# Patient Record
Sex: FEMALE | Race: BLACK OR AFRICAN AMERICAN | NOT HISPANIC OR LATINO | Employment: OTHER | URBAN - METROPOLITAN AREA
[De-identification: names, ages, dates, MRNs, and addresses within clinical notes are randomized per-mention and may not be internally consistent; named-entity substitution may affect disease eponyms.]

---

## 2017-04-17 ENCOUNTER — HOSPITAL ENCOUNTER (EMERGENCY)
Facility: HOSPITAL | Age: 27
Discharge: LEFT BEFORE DISCHARGE | End: 2017-04-17

## 2017-04-17 VITALS
HEIGHT: 63 IN | DIASTOLIC BLOOD PRESSURE: 60 MMHG | HEART RATE: 66 BPM | OXYGEN SATURATION: 99 % | WEIGHT: 160 LBS | TEMPERATURE: 97.3 F | RESPIRATION RATE: 18 BRPM | SYSTOLIC BLOOD PRESSURE: 105 MMHG | BODY MASS INDEX: 28.35 KG/M2

## 2017-04-17 PROCEDURE — 99211 OFF/OP EST MAY X REQ PHY/QHP: CPT

## 2017-04-17 RX ORDER — QUETIAPINE FUMARATE 100 MG/1
100 TABLET, FILM COATED ORAL NIGHTLY
COMMUNITY

## 2017-04-17 RX ORDER — GABAPENTIN 600 MG/1
600 TABLET ORAL 3 TIMES DAILY
COMMUNITY

## 2017-04-17 RX ORDER — CYCLOBENZAPRINE HCL 10 MG
10 TABLET ORAL 3 TIMES DAILY PRN
COMMUNITY

## 2017-04-17 NOTE — ED NOTES
Pt approached the desk stating that she was hurting too much and that we needed to get her back immediately. I stated that we needed to do an EKG on her because  Of her chest pain. She stated that that wouldn't help her pain at all and that she didn't want to do that. She then walked away from the desk.     Gabby Mendez  04/17/17 7222

## 2017-04-18 ENCOUNTER — APPOINTMENT (OUTPATIENT)
Dept: GENERAL RADIOLOGY | Facility: HOSPITAL | Age: 27
End: 2017-04-18

## 2017-04-18 ENCOUNTER — HOSPITAL ENCOUNTER (EMERGENCY)
Facility: HOSPITAL | Age: 27
Discharge: HOME | End: 2017-04-18
Attending: EMERGENCY MEDICINE | Admitting: EMERGENCY MEDICINE

## 2017-04-18 VITALS
HEIGHT: 63 IN | RESPIRATION RATE: 16 BRPM | BODY MASS INDEX: 28.35 KG/M2 | DIASTOLIC BLOOD PRESSURE: 76 MMHG | OXYGEN SATURATION: 99 % | HEART RATE: 70 BPM | SYSTOLIC BLOOD PRESSURE: 117 MMHG | WEIGHT: 160 LBS | TEMPERATURE: 97.8 F

## 2017-04-18 DIAGNOSIS — G89.29 OTHER CHRONIC PAIN: Primary | ICD-10-CM

## 2017-04-18 DIAGNOSIS — D57.00 SICKLE-CELL DISEASE WITH PAIN (HCC): ICD-10-CM

## 2017-04-18 LAB
ALBUMIN SERPL-MCNC: 4.6 G/DL (ref 3.5–5.2)
ALBUMIN/GLOB SERPL: 1.6 G/DL
ALP SERPL-CCNC: 76 U/L (ref 39–117)
ALT SERPL W P-5'-P-CCNC: 10 U/L (ref 1–33)
ANION GAP SERPL CALCULATED.3IONS-SCNC: 13.8 MMOL/L
ANISOCYTOSIS BLD QL: NORMAL
AST SERPL-CCNC: 12 U/L (ref 1–32)
BASOPHILS # BLD AUTO: 0.04 10*3/MM3 (ref 0–0.2)
BASOPHILS NFR BLD AUTO: 0.7 % (ref 0–1.5)
BILIRUB SERPL-MCNC: 3 MG/DL (ref 0.1–1.2)
BUN BLD-MCNC: 5 MG/DL (ref 6–20)
BUN/CREAT SERPL: 7.4 (ref 7–25)
C3 FRG RBC-MCNC: NORMAL
CALCIUM SPEC-SCNC: 9.4 MG/DL (ref 8.6–10.5)
CHLORIDE SERPL-SCNC: 108 MMOL/L (ref 98–107)
CO2 SERPL-SCNC: 22.2 MMOL/L (ref 22–29)
CREAT BLD-MCNC: 0.68 MG/DL (ref 0.57–1)
DEPRECATED RDW RBC AUTO: 82.2 FL (ref 37–54)
EOSINOPHIL # BLD AUTO: 0.08 10*3/MM3 (ref 0–0.7)
EOSINOPHIL NFR BLD AUTO: 1.4 % (ref 0.3–6.2)
ERYTHROCYTE [DISTWIDTH] IN BLOOD BY AUTOMATED COUNT: 21.2 % (ref 11.7–13)
GFR SERPL CREATININE-BSD FRML MDRD: 126 ML/MIN/1.73
GLOBULIN UR ELPH-MCNC: 2.8 GM/DL
GLUCOSE BLD-MCNC: 89 MG/DL (ref 65–99)
HCT VFR BLD AUTO: 27.2 % (ref 35.6–45.5)
HGB BLD-MCNC: 9.4 G/DL (ref 11.9–15.5)
HOWELL-JOLLY BOD BLD QL SMEAR: PRESENT
HYPOCHROMIA BLD QL: NORMAL
IMM GRANULOCYTES # BLD: 0 10*3/MM3 (ref 0–0.03)
IMM GRANULOCYTES NFR BLD: 0 % (ref 0–0.5)
LYMPHOCYTES # BLD AUTO: 2.25 10*3/MM3 (ref 0.9–4.8)
LYMPHOCYTES NFR BLD AUTO: 38 % (ref 19.6–45.3)
MACROCYTES BLD QL SMEAR: NORMAL
MCH RBC QN AUTO: 36.9 PG (ref 26.9–32)
MCHC RBC AUTO-ENTMCNC: 34.6 G/DL (ref 32.4–36.3)
MCV RBC AUTO: 106.7 FL (ref 80.5–98.2)
MONOCYTES # BLD AUTO: 0.64 10*3/MM3 (ref 0.2–1.2)
MONOCYTES NFR BLD AUTO: 10.8 % (ref 5–12)
NEUTROPHILS # BLD AUTO: 2.91 10*3/MM3 (ref 1.9–8.1)
NEUTROPHILS NFR BLD AUTO: 49.1 % (ref 42.7–76)
NRBC BLD MANUAL-RTO: 2.3 /100 WBC (ref 0–0)
PLAT MORPH BLD: NORMAL
PLATELET # BLD AUTO: 411 10*3/MM3 (ref 140–500)
PMV BLD AUTO: 9.8 FL (ref 6–12)
POTASSIUM BLD-SCNC: 3.1 MMOL/L (ref 3.5–5.2)
PROT SERPL-MCNC: 7.4 G/DL (ref 6–8.5)
RBC # BLD AUTO: 2.55 10*6/MM3 (ref 3.9–5.2)
RETICS/RBC NFR AUTO: 6.47 % (ref 0.5–1.5)
SICKLE CELLS: NORMAL
SODIUM BLD-SCNC: 144 MMOL/L (ref 136–145)
TARGETS BLD QL SMEAR: NORMAL
WBC MORPH BLD: NORMAL
WBC NRBC COR # BLD: 5.92 10*3/MM3 (ref 4.5–10.7)

## 2017-04-18 PROCEDURE — 71020 HC CHEST PA AND LATERAL: CPT

## 2017-04-18 PROCEDURE — 96374 THER/PROPH/DIAG INJ IV PUSH: CPT

## 2017-04-18 PROCEDURE — 99283 EMERGENCY DEPT VISIT LOW MDM: CPT

## 2017-04-18 PROCEDURE — 85007 BL SMEAR W/DIFF WBC COUNT: CPT | Performed by: EMERGENCY MEDICINE

## 2017-04-18 PROCEDURE — 25010000002 HYDROMORPHONE PER 4 MG: Performed by: EMERGENCY MEDICINE

## 2017-04-18 PROCEDURE — 80053 COMPREHEN METABOLIC PANEL: CPT | Performed by: EMERGENCY MEDICINE

## 2017-04-18 PROCEDURE — 85025 COMPLETE CBC W/AUTO DIFF WBC: CPT | Performed by: EMERGENCY MEDICINE

## 2017-04-18 PROCEDURE — 25010000002 HEPARIN FLUSH (PORCINE) 100 UNIT/ML SOLUTION

## 2017-04-18 PROCEDURE — 96361 HYDRATE IV INFUSION ADD-ON: CPT

## 2017-04-18 PROCEDURE — 85045 AUTOMATED RETICULOCYTE COUNT: CPT | Performed by: EMERGENCY MEDICINE

## 2017-04-18 RX ORDER — HYDROMORPHONE HYDROCHLORIDE 1 MG/ML
0.5 INJECTION, SOLUTION INTRAMUSCULAR; INTRAVENOUS; SUBCUTANEOUS ONCE
Status: COMPLETED | OUTPATIENT
Start: 2017-04-18 | End: 2017-04-18

## 2017-04-18 RX ADMIN — SODIUM CHLORIDE 1000 ML: 9 INJECTION, SOLUTION INTRAVENOUS at 15:14

## 2017-04-18 RX ADMIN — Medication 500 UNITS: at 17:24

## 2017-04-18 RX ADMIN — HYDROMORPHONE HYDROCHLORIDE 0.5 MG: 1 INJECTION, SOLUTION INTRAMUSCULAR; INTRAVENOUS; SUBCUTANEOUS at 15:19

## 2017-04-18 NOTE — DISCHARGE INSTRUCTIONS
Follow up with your nurse practitioner at the [facility] as scheduled.  You also need to follow up with your pain management physician as soon as possible.  Continue your current medications.

## 2017-04-18 NOTE — ED TRIAGE NOTES
"Pt states she was here yesterday but the wait was long. States her pain is 10 times worse today. States she has \"sickle cell crisis, chest pain and leg pain.\"  Patient on phone carrying on a conversation.   "

## 2017-04-18 NOTE — ED PROVIDER NOTES
EMERGENCY DEPARTMENT ENCOUNTER       CHIEF COMPLAINT  Chief Complaint: sickle cell pain crisis  History given by: patient  History limited by: N/A  Room Number: 12/12  PMD: No Known Provider  Hematologist- [facility] at     HPI:  HPI Comments: Pt has hx of sickle cell anemia for which she is on hydroxyurea. She c/o general body aches and diffuse chest pain which started a few days ago and are characteristic of previous episodes of sickle cell pain crisis. She has also had nausea and states that she vomited about 5 days ago (no vomiting since then). She denies trouble breathing, sweating, diarrhea, fevers, chills, abd pain, pain and difficulty with urination, weakness, dizziness, headache, and medical noncompliance (states that she has been taking her hydroxyurea and eliquis as directed). Pt also states that she was last seen at a healthcare facility for sickle cell pain crisis about 2 to 3 months ago. Pt has no other complaints at this time.     Patient is a 27 y.o. female presenting with sickle cell pain.   History provided by:  Patient  Sickle Cell Pain Crisis   Location:  Chest (general body)  Severity:  Moderate  Onset quality:  Gradual  Duration: started a few days ago.  Similar to previous crisis episodes: yes    Timing:  Constant  Progression:  Worsening  Chronicity:  Recurrent  Relieved by: taking hydrocodone.  Worsened by:  Nothing  Associated symptoms: chest pain and nausea    Associated symptoms: no congestion, no cough, no fever, no headaches, no shortness of breath and no sore throat  Vomiting: occurred 5 days ago, resolved.          PAST MEDICAL HISTORY  Active Ambulatory Problems     Diagnosis Date Noted   • Sickle cell crisis 05/21/2016   • Anemia 05/21/2016   • Atelectasis of right lower lobe 05/21/2016   • SLE (systemic lupus erythematosus) 05/21/2016   • Blood clotting disorder 05/21/2016   • Dehydration 05/21/2016     Resolved Ambulatory Problems     Diagnosis Date Noted   • No  Resolved Ambulatory Problems     Past Medical History:   Diagnosis Date   • Blood clotting disorder    • Lupus    • PE (pulmonary thromboembolism)    • Sickle cell anemia          PAST SURGICAL HISTORY  Past Surgical History:   Procedure Laterality Date   • BACK SURGERY     • CHOLECYSTECTOMY     • SPLENECTOMY     • SPLENECTOMY           FAMILY HISTORY  History reviewed. No pertinent family history.      SOCIAL HISTORY  Social History     Social History   • Marital status:      Spouse name: N/A   • Number of children: N/A   • Years of education: N/A     Occupational History   • Not on file.     Social History Main Topics   • Smoking status: Never Smoker   • Smokeless tobacco: Not on file   • Alcohol use No   • Drug use: No   • Sexual activity: Defer     Other Topics Concern   • Not on file     Social History Narrative         ALLERGIES  Amoxicillin; Aspirin; Augmentin [amoxicillin-pot clavulanate]; Ceclor [cefaclor]; Morphine and related; Rocephin [ceftriaxone]; Toradol [ketorolac tromethamine]; Tramadol; and Zofran [ondansetron hcl]        REVIEW OF SYSTEMS  Review of Systems   Constitutional: Negative for chills and fever.   HENT: Negative for congestion, rhinorrhea and sore throat.    Eyes: Negative for pain.   Respiratory: Negative for cough and shortness of breath.    Cardiovascular: Positive for chest pain. Negative for palpitations.   Gastrointestinal: Positive for nausea. Negative for abdominal pain and diarrhea. Vomiting: occurred 5 days ago, resolved.   Endocrine: Negative.    Genitourinary: Negative for difficulty urinating.   Musculoskeletal: Positive for arthralgias (generalized) and myalgias (generalized).   Skin: Negative.    Neurological: Negative for speech difficulty, weakness, numbness and headaches.   Psychiatric/Behavioral: Negative.    All other systems reviewed and are negative.           PHYSICAL EXAM  ED Triage Vitals   Temp Heart Rate Resp BP SpO2   04/18/17 1213 04/18/17 1213  04/18/17 1213 04/18/17 1222 04/18/17 1213   96.3 °F (35.7 °C) 98 15 105/63 96 % WNL      Temp src Heart Rate Source Patient Position BP Location FiO2 (%)   04/18/17 1213 04/18/17 1213 -- -- --   Tympanic Monitor          Physical Exam   Constitutional: She is oriented to person, place, and time. No distress.   HENT:   Head: Normocephalic.   Mouth/Throat: Mucous membranes are normal.   Eyes: EOM are normal. Pupils are equal, round, and reactive to light.   Neck: Normal range of motion. Neck supple.   Cardiovascular: Normal rate, regular rhythm and normal heart sounds.    Pulmonary/Chest: Effort normal and breath sounds normal. No respiratory distress. She has no decreased breath sounds. She has no wheezes. She has no rhonchi. She has no rales.   Abdominal: Soft. There is no tenderness. There is no rebound and no guarding.   Musculoskeletal: Normal range of motion.   Neurological: She is alert and oriented to person, place, and time. She has normal sensation and normal strength.   Skin: Skin is warm and dry.   Psychiatric: Mood and affect normal.   Nursing note and vitals reviewed.          LAB RESULTS  Recent Results (from the past 24 hour(s))   Comprehensive Metabolic Panel    Collection Time: 04/18/17  3:13 PM   Result Value Ref Range    Glucose 89 65 - 99 mg/dL    BUN 5 (L) 6 - 20 mg/dL    Creatinine 0.68 0.57 - 1.00 mg/dL    Sodium 144 136 - 145 mmol/L    Potassium 3.1 (L) 3.5 - 5.2 mmol/L    Chloride 108 (H) 98 - 107 mmol/L    CO2 22.2 22.0 - 29.0 mmol/L    Calcium 9.4 8.6 - 10.5 mg/dL    Total Protein 7.4 6.0 - 8.5 g/dL    Albumin 4.60 3.50 - 5.20 g/dL    ALT (SGPT) 10 1 - 33 U/L    AST (SGOT) 12 1 - 32 U/L    Alkaline Phosphatase 76 39 - 117 U/L    Total Bilirubin 3.0 (H) 0.1 - 1.2 mg/dL    eGFR  African Amer 126 >60 mL/min/1.73    Globulin 2.8 gm/dL    A/G Ratio 1.6 g/dL    BUN/Creatinine Ratio 7.4 7.0 - 25.0    Anion Gap 13.8 mmol/L   Reticulocytes    Collection Time: 04/18/17  3:13 PM   Result Value Ref  Range    Reticulocyte % 6.47 (H) 0.50 - 1.50 %   CBC Auto Differential    Collection Time: 04/18/17  3:13 PM   Result Value Ref Range    WBC 5.92 4.50 - 10.70 10*3/mm3    RBC 2.55 (L) 3.90 - 5.20 10*6/mm3    Hemoglobin 9.4 (L) 11.9 - 15.5 g/dL    Hematocrit 27.2 (L) 35.6 - 45.5 %    .7 (H) 80.5 - 98.2 fL    MCH 36.9 (H) 26.9 - 32.0 pg    MCHC 34.6 32.4 - 36.3 g/dL    RDW 21.2 (H) 11.7 - 13.0 %    RDW-SD 82.2 (H) 37.0 - 54.0 fl    MPV 9.8 6.0 - 12.0 fL    Platelets 411 140 - 500 10*3/mm3    Neutrophil % 49.1 42.7 - 76.0 %    Lymphocyte % 38.0 19.6 - 45.3 %    Monocyte % 10.8 5.0 - 12.0 %    Eosinophil % 1.4 0.3 - 6.2 %    Basophil % 0.7 0.0 - 1.5 %    Immature Grans % 0.0 0.0 - 0.5 %    Neutrophils, Absolute 2.91 1.90 - 8.10 10*3/mm3    Lymphocytes, Absolute 2.25 0.90 - 4.80 10*3/mm3    Monocytes, Absolute 0.64 0.20 - 1.20 10*3/mm3    Eosinophils, Absolute 0.08 0.00 - 0.70 10*3/mm3    Basophils, Absolute 0.04 0.00 - 0.20 10*3/mm3    Immature Grans, Absolute 0.00 0.00 - 0.03 10*3/mm3    nRBC 2.3 (H) 0.0 - 0.0 /100 WBC   Scan Slide    Collection Time: 04/18/17  3:13 PM   Result Value Ref Range    Anisocytosis Large/3+ None Seen    Waldrop-Esmont Bodies Present None Seen    Hypochromia Mod/2+ None Seen    Macrocytes Mod/2+ None Seen    RBC Fragments Slight/1+ None Seen    Sickle Cells Slight/1+ None Seen    Target Cells Slight/1+ None Seen    WBC Morphology Normal Normal    Platelet Morphology Normal Normal       Ordered the above labs and reviewed the results.        RADIOLOGY  XR Chest 2 View   Final Result         CXR (independently viewed by me, interpreted by radiologist)- probable tiny right pleural effusion, otherwise unremarkable     Ordered the above noted radiological studies. Reviewed by me in PACS.       PROCEDURES  Procedures      PROGRESS AND CONSULTS  ED Course     2:29 PM- Ordered IV fluids for hydration. Ordered blood work, reticulocyte count, and CXR for further evaluation.    3:09 PM- Ordered  dilaudid for pain.     3:45 PM- Message left with pt's hematologist (NEEL Brady) at [facility].     4:58 PM- Discussed case with Ms Tong (NEEL at [facility])  Reviewed history, exam, results and treatments.  Discussed concerns and plan of care. Ms Tong states that pt has frequent issues with pain control and is scheduled to follow up on 4/20/17 at their office.     5:00 PM- EKASPER queried and reviewed. Pt had #90 10mg hydrocodone filled on 3/28/17.     5:02 PM- Rechecked pt. She is resting comfortably and is in no acute distress. D/w pt about all pertinent results including hemoglobin of 9.4 and reticulocyte count of 6.47. Informed pt that she had hydrocodone filled on 3/28/17 per EKASPER report. Pt states that she is in pain management. She reports that she has taken extra doses of her hydrocodone for her pain and has consequently finished taking it early. Notified pt that I cannot prescribe any pain medication as she is in pain management. Instructed to f/u with hematologist for recheck as scheduled on 4/20/17. RTER if sx worsen. Pt understands and agrees with plan. All questions addressed.        MEDICAL DECISION MAKING  Results were reviewed/discussed with the patient.     MDM  Number of Diagnoses or Management Options  Other chronic pain:   Sickle-cell disease with pain:      Amount and/or Complexity of Data Reviewed  Clinical lab tests: reviewed and ordered (Reticulocytes= 6.47, hgb= 9.4)  Tests in the radiology section of CPT®: reviewed and ordered (CXR- probable tiny right pleural effusion, otherwise unremarkable )  Decide to obtain previous medical records or to obtain history from someone other than the patient: yes  Review and summarize past medical records: yes (5 months ago, hgb was 9.2 and reticulocyte count was 8.86. )  Discuss the patient with other providers: yes (Ms Tong (NEEL at [facility]))  Independent visualization of images, tracings, or specimens:  yes    Patient Progress  Patient progress: stable             DIAGNOSIS  Final diagnoses:   Other chronic pain   Sickle-cell disease with pain         DISPOSITION  Discharged    DISCHARGE    Patient discharged in stable condition.    Reviewed implications of results, diagnosis, meds, responsibility to follow up, warning signs and symptoms of possible worsening, potential complications and reasons to return to ER.    Patient voiced understanding of above instructions.    Discussed plan for discharge, as there is no emergent indication for admission.  Pt is agreeable and understands need for follow up and repeat testing.  Pt is aware that discharge does not mean that nothing is wrong but it indicates no emergency is present and they must continue care with follow-up as given below or physician of their choice.     FOLLOW-UP  [facility] as scheduled       Latest Documented Vital Signs:  As of 5:15 PM  BP- 114/72 HR- 64 Temp- 96.3 °F (35.7 °C) (Tympanic) O2 sat- 99%      --  Documentation assistance provided by fabrizio Bartholomew for Dr Cotton.  Information recorded by the scribe was done at my direction and has been verified and validated by me.         Shayne Bartholomew  04/18/17 1910       Ambrocio Cotton MD  04/18/17 1923

## 2017-05-24 ENCOUNTER — HOSPITAL ENCOUNTER (EMERGENCY)
Facility: HOSPITAL | Age: 27
Discharge: LEFT BEFORE DISCHARGE | End: 2017-05-24
Attending: EMERGENCY MEDICINE | Admitting: EMERGENCY MEDICINE

## 2017-05-24 ENCOUNTER — APPOINTMENT (OUTPATIENT)
Dept: GENERAL RADIOLOGY | Facility: HOSPITAL | Age: 27
End: 2017-05-24

## 2017-05-24 VITALS
BODY MASS INDEX: 28.35 KG/M2 | RESPIRATION RATE: 18 BRPM | OXYGEN SATURATION: 96 % | WEIGHT: 160 LBS | DIASTOLIC BLOOD PRESSURE: 71 MMHG | SYSTOLIC BLOOD PRESSURE: 112 MMHG | TEMPERATURE: 97.3 F | HEIGHT: 63 IN | HEART RATE: 67 BPM

## 2017-05-24 DIAGNOSIS — D57.1 SICKLE CELL DISEASE WITHOUT CRISIS (HCC): ICD-10-CM

## 2017-05-24 DIAGNOSIS — G89.4 CHRONIC PAIN SYNDROME: Primary | ICD-10-CM

## 2017-05-24 PROCEDURE — 99284 EMERGENCY DEPT VISIT MOD MDM: CPT

## 2017-05-24 PROCEDURE — 71020 HC CHEST PA AND LATERAL: CPT

## 2017-05-24 RX ORDER — HYDROCODONE BITARTRATE AND ACETAMINOPHEN 7.5; 325 MG/1; MG/1
1 TABLET ORAL EVERY 6 HOURS PRN
Status: DISCONTINUED | OUTPATIENT
Start: 2017-05-24 | End: 2017-05-24 | Stop reason: HOSPADM

## 2017-05-24 RX ORDER — ONDANSETRON 4 MG/1
4 TABLET, ORALLY DISINTEGRATING ORAL ONCE
Status: DISCONTINUED | OUTPATIENT
Start: 2017-05-24 | End: 2017-05-24 | Stop reason: HOSPADM

## 2017-07-02 ENCOUNTER — INPATIENT HOSPITAL (OUTPATIENT)
Dept: URBAN - METROPOLITAN AREA HOSPITAL 84 | Facility: HOSPITAL | Age: 27
End: 2017-07-02

## 2017-07-02 DIAGNOSIS — D57.819 OTHER SICKLE-CELL DISORDERS WITH CRISIS, UNSPECIFIED: ICD-10-CM

## 2017-07-02 DIAGNOSIS — R74.8 ABNORMAL LEVELS OF OTHER SERUM ENZYMES: ICD-10-CM

## 2017-07-02 DIAGNOSIS — R19.5 OTHER FECAL ABNORMALITIES: ICD-10-CM

## 2017-07-02 PROCEDURE — 99221 1ST HOSP IP/OBS SF/LOW 40: CPT | Performed by: INTERNAL MEDICINE

## 2018-03-23 ENCOUNTER — APPOINTMENT (OUTPATIENT)
Dept: LAB | Facility: HOSPITAL | Age: 28
End: 2018-03-23

## 2018-03-23 ENCOUNTER — APPOINTMENT (OUTPATIENT)
Dept: ONCOLOGY | Facility: CLINIC | Age: 28
End: 2018-03-23

## 2020-10-02 ENCOUNTER — TELEPHONE (OUTPATIENT)
Dept: ORTHOPEDICS | Facility: OTHER | Age: 30
End: 2020-10-02

## 2020-10-02 NOTE — TELEPHONE ENCOUNTER
PT.CALLED TO CXL HER APPT. TODAY AT 2:50P.M. WITH .  SHE APOLOGIZED- HER RIDE TARC 3 CANCELLED ON HER TODAY AND SHE DOES NOT HAVE A RIDE.   SHE IS RESCHEDULED FOR 10/30/2020.  PT. STATES TO LET OFFICE KNOW THAT SHE IS HAVING XRAYS DONE AT HER HEMATOLOGIST ON Monday AND WILL HAVE THEM SEND RESULTS TO DR. REY.

## 2020-10-30 ENCOUNTER — OFFICE VISIT (OUTPATIENT)
Dept: ORTHOPEDIC SURGERY | Facility: CLINIC | Age: 30
End: 2020-10-30

## 2020-10-30 VITALS — TEMPERATURE: 98 F | BODY MASS INDEX: 25.87 KG/M2 | WEIGHT: 146 LBS | HEIGHT: 63 IN

## 2020-10-30 DIAGNOSIS — M25.511 RIGHT SHOULDER PAIN, UNSPECIFIED CHRONICITY: Primary | ICD-10-CM

## 2020-10-30 PROCEDURE — 73030 X-RAY EXAM OF SHOULDER: CPT | Performed by: ORTHOPAEDIC SURGERY

## 2020-10-30 PROCEDURE — 99203 OFFICE O/P NEW LOW 30 MIN: CPT | Performed by: ORTHOPAEDIC SURGERY

## 2020-10-30 RX ORDER — HYDROCODONE BITARTRATE AND ACETAMINOPHEN 10; 325 MG/1; MG/1
1 TABLET ORAL
COMMUNITY
Start: 2016-10-20

## 2020-10-30 RX ORDER — ALBUTEROL SULFATE 90 UG/1
2 AEROSOL, METERED RESPIRATORY (INHALATION) EVERY 4 HOURS PRN
COMMUNITY

## 2020-10-30 RX ORDER — DEFERASIROX 500 MG/1
TABLET, FOR SUSPENSION ORAL
COMMUNITY

## 2020-10-30 RX ORDER — FOLIC ACID 1 MG/1
1 TABLET ORAL DAILY
COMMUNITY

## 2020-10-30 RX ORDER — POTASSIUM CITRATE 5 MEQ/1
TABLET, EXTENDED RELEASE ORAL
COMMUNITY

## 2020-10-30 RX ORDER — ALBUTEROL SULFATE 2.5 MG/3ML
2.5 SOLUTION RESPIRATORY (INHALATION) EVERY 4 HOURS PRN
COMMUNITY

## 2020-10-30 RX ORDER — LANOLIN ALCOHOL/MO/W.PET/CERES
1000 CREAM (GRAM) TOPICAL DAILY
COMMUNITY

## 2020-10-30 RX ORDER — GABAPENTIN 600 MG/1
600 TABLET ORAL
COMMUNITY
End: 2020-10-30 | Stop reason: SDUPTHER

## 2020-10-30 RX ORDER — PROMETHAZINE HYDROCHLORIDE 6.25 MG/5ML
SYRUP ORAL 4 TIMES DAILY PRN
COMMUNITY

## 2020-10-30 RX ORDER — LEVOFLOXACIN 500 MG/1
500 TABLET, FILM COATED ORAL DAILY
COMMUNITY

## 2020-10-30 NOTE — PROGRESS NOTES
Patient: Orville Flores    YOB: 1990    Medical Record Number: [id number]    Chief Complaints:  Right shoulder pain    History of Present Illness:     30 y.o. female patient who comes in for evaluation of her right shoulder.  She has a history of sickle cell disease.  She has avascular necrosis in her left hip.  She has been told that she has avascular necrosis in her right shoulder.  The shoulder has been bothering her for years, getting progressively worse.  She is right-hand dominant and this has been very debilitating for her.  Her pain is typically severe, constant, and both aching and grinding.  Her range of motion is very limited.  She reports that she cannot reach or lift overhead.    Allergies:   Allergies   Allergen Reactions   • Amoxicillin    • Aspirin    • Augmentin [Amoxicillin-Pot Clavulanate]    • Ceclor [Cefaclor]    • Morphine And Related    • Rocephin [Ceftriaxone]    • Toradol [Ketorolac Tromethamine]    • Tramadol    • Zofran [Ondansetron Hcl]        Home Medications:    Current Outpatient Medications:   •  albuterol (PROVENTIL) (2.5 MG/3ML) 0.083% nebulizer solution, Take 2.5 mg by nebulization Every 4 (Four) Hours As Needed for Wheezing., Disp: , Rfl:   •  albuterol sulfate  (90 Base) MCG/ACT inhaler, Inhale 2 puffs Every 4 (Four) Hours As Needed for Wheezing., Disp: , Rfl:   •  Apixaban (ELIQUIS PO), Take 15 mg by mouth daily., Disp: , Rfl:   •  cyclobenzaprine (FLEXERIL) 10 MG tablet, Take 10 mg by mouth 3 (Three) Times a Day As Needed for Muscle Spasms., Disp: , Rfl:   •  deferasirox (EXJADE) 500 MG disintegrating tablet, Place  on the tongue Every Morning Before Breakfast., Disp: , Rfl:   •  folic acid (FOLVITE) 1 MG tablet, Take 1 mg by mouth Daily., Disp: , Rfl:   •  gabapentin (NEURONTIN) 600 MG tablet, Take 600 mg by mouth 3 (Three) Times a Day., Disp: , Rfl:   •  GLUTAMINE PO, Take  by mouth., Disp: , Rfl:   •  HYDROcodone-acetaminophen (NORCO)  MG per  tablet, Take 1 tablet by mouth., Disp: , Rfl:   •  hydroxyurea (HYDREA) 500 MG capsule, Take 1,000 mg by mouth daily., Disp: , Rfl:   •  levoFLOXacin (LEVAQUIN) 500 MG tablet, Take 500 mg by mouth Daily., Disp: , Rfl:   •  melatonin 5 MG sublingual tablet sublingual tablet, Place 5 mg under the tongue At Night As Needed., Disp: , Rfl:   •  mirtazapine (REMERON) 15 MG tablet, Take 15 mg by mouth every night., Disp: , Rfl:   •  potassium citrate (UROCIT-K) 5 MEQ (540 MG) CR tablet, Take  by mouth 3 (Three) Times a Day With Meals., Disp: , Rfl:   •  promethazine (PHENERGAN) 6.25 MG/5ML syrup, Take  by mouth 4 (Four) Times a Day As Needed for Nausea or Vomiting., Disp: , Rfl:   •  QUEtiapine (SEROquel) 100 MG tablet, Take 100 mg by mouth Every Night., Disp: , Rfl:   •  traZODone (DESYREL) 50 MG tablet, Take 100 mg by mouth Every Night., Disp: , Rfl:   •  vitamin B-12 (CYANOCOBALAMIN) 1000 MCG tablet, Take 1,000 mcg by mouth Daily., Disp: , Rfl:   •  zolpidem (AMBIEN) 10 MG tablet, Take 10 mg by mouth at night as needed for sleep., Disp: , Rfl:     Past Medical History:   Diagnosis Date   • Anxiety    • Asthma    • Blood clotting disorder (CMS/HCC)    • Cardiomegaly    • Cardiomyopathy (CMS/HCC)    • Depression    • Esophageal dysphagia    • H/O Drug-seeking behavior    • H/O food allergy     Bananas, carrots   • History of blood transfusion    • History of pneumonia    • History of spontaneous      x3   • History of stillbirth     at 20 weeks   • Lupus (CMS/HCC)    • Pancreatitis 2015   • PE (pulmonary thromboembolism) (CMS/HCC)    • Sickle cell anemia (CMS/HCC)    • Stroke (CMS/HCC) 2012    H/O CVA, has remained anticoagulated since then.   • TIA (transient ischemic attack)    • Vaginal delivery 2015       Past Surgical History:   Procedure Laterality Date   • BACK SURGERY      spinal fusion    • CHOLECYSTECTOMY     • PORTACATH PLACEMENT     • SPLENECTOMY  2013   • SPLENECTOMY     • TUBAL ABDOMINAL  "LIGATION         Social History     Occupational History     Employer: DISABLED   Tobacco Use   • Smoking status: Never Smoker   • Smokeless tobacco: Never Used   Substance and Sexual Activity   • Alcohol use: No   • Drug use: No   • Sexual activity: Defer      Social History     Social History Narrative    Patient has 1 living child.       History reviewed. No pertinent family history.    Review of Systems:      Constitutional: Denies fever, shaking or chills   Eyes: Denies change in visual acuity   HEENT: Denies nasal congestion or sore throat   Respiratory: Denies cough or shortness of breath   Cardiovascular: Denies chest pain or edema  Endocrine: Denies tremors, palpitations, intolerance of heat or cold, polyuria, polydipsia.  GI: Denies abdominal pain, nausea, vomiting, bloody stools or diarrhea  : Denies frequency, urgency, incontinence, retention, or nocturia.  Musculoskeletal: Denies numbness, tingling or loss of motor function   Integument: Denies rash, lesion or ulceration   Neurologic: Denies headache or focal weakness, deficits  Heme: Denies spontaneous or excessive bleeding, epistaxis, hematuria, melena, fatigue, enlarged or tender lymph nodes.      All other pertinent positives and negatives as noted above in HPI.    Physical Exam:   30 y.o. female  Vitals:    10/30/20 1146   Temp: 98 °F (36.7 °C)   TempSrc: Temporal   Weight: 66.2 kg (146 lb)   Height: 160 cm (63\")       General:  Patient is awake and alert.  Appears in no acute distress or discomfort.    Psych:  Affect and demeanor are appropriate.    Eyes:  Conjunctiva and sclera appear grossly normal.  Eyes track well and EOM seem to be intact.    Ears:  No gross abnormalities.  Hearing adequate for the exam.    Cardiovascular:  Regular rate and rhythm.    Lungs:  Good chest expansion.  Breathing unlabored.    Spine:  Neck appears grossly normal.  No palpable masses or adenopathy.  Good motion.  Spurling's maneuver is negative for any shoulder " or arm symptoms.    Extremities:  Right shoulder is examined.  Skin is benign.  No obvious gross abnormalities.  No palpable masses or adenopathy.  Moderate tenderness noted over anterior glenohumeral joint and rotator interval.  Active and passive motion are very limited.  Actively, she can abduct about 30 degrees, elevate about 45 or 50 degrees and externally rotate about 15 degrees.  Passively, I can abduct her about 40 or 50 degrees, bring her up to about 60 or 70 degrees of elevation and externally rotate her about 30 degrees.  Could not assess stability due to her discomfort and limited motion.  Could not really get a good sense of her rotator cuff or deltoid strength either, due to pain.  Intact axillary nerve sensation.  Good motor and sensory function in her lower arm and hand.  Palpable radial pulse.  Brisk capillary refill.         Radiology:  AP, scapular Y, and axillary views of the right shoulder are ordered by myself and reviewed to evaluate the patient's complaint.  No comparison films are immediately available.  The x-rays show findings consistent with avascular necrosis.  Acromiohumeral interval measures 5 mm.    Assessment/Plan:  Right shoulder avascular necrosis and possible associated rotator cuff tear and patient with history of sickle cell disease    She has a very difficult problem for such a young person.  I would want to do everything we could to avoid an arthroplasty but we may have to consider that sooner than I would like due to her symptomatology and very poor function.  She would like to pursue further work-up and potential surgical options but is potentially open to conservative treatment as well.  I recommend that we start with an MRI so that I can get an assessment of her rotator cuff integrity and better define exactly what her options would be from a surgical standpoint.  I told her I will call her with the results.  We will come up with a plan pending review of that  study.    Steve Granados MD    10/30/2020    CC to Noni Rasmussen

## 2020-11-19 ENCOUNTER — TELEPHONE (OUTPATIENT)
Dept: ORTHOPEDIC SURGERY | Facility: CLINIC | Age: 30
End: 2020-11-19

## 2020-12-15 ENCOUNTER — APPOINTMENT (OUTPATIENT)
Dept: MRI IMAGING | Facility: HOSPITAL | Age: 30
End: 2020-12-15

## 2021-01-05 ENCOUNTER — APPOINTMENT (OUTPATIENT)
Dept: MRI IMAGING | Facility: HOSPITAL | Age: 31
End: 2021-01-05

## 2021-01-21 ENCOUNTER — APPOINTMENT (OUTPATIENT)
Dept: MRI IMAGING | Facility: HOSPITAL | Age: 31
End: 2021-01-21

## 2021-03-01 ENCOUNTER — HOSPITAL ENCOUNTER (OUTPATIENT)
Dept: MRI IMAGING | Facility: HOSPITAL | Age: 31
Discharge: HOME | End: 2021-03-01
Admitting: ORTHOPAEDIC SURGERY

## 2021-03-01 DIAGNOSIS — M25.511 RIGHT SHOULDER PAIN, UNSPECIFIED CHRONICITY: ICD-10-CM

## 2021-03-01 PROCEDURE — 73221 MRI JOINT UPR EXTREM W/O DYE: CPT

## 2021-03-03 ENCOUNTER — TELEPHONE (OUTPATIENT)
Dept: ORTHOPEDIC SURGERY | Facility: CLINIC | Age: 31
End: 2021-03-03

## 2021-03-04 ENCOUNTER — TELEPHONE (OUTPATIENT)
Dept: ORTHOPEDICS | Facility: OTHER | Age: 31
End: 2021-03-04

## 2021-03-04 NOTE — TELEPHONE ENCOUNTER
Provider: DR REY   Caller: MRS MORGAN   Relationship to Patient: PATIENT   Phone Number: [phone]  Reason for Call: PATIENT WAS CALLING BACK RETURNING CALL BACK TO PROVIDER, PATIENT STATES WAS IN ZOOM MEETING WHEN HE CALLED AND ACCIDENTALLY HUNG UP ON HIM.

## 2021-03-05 ENCOUNTER — TELEPHONE (OUTPATIENT)
Dept: ORTHOPEDIC SURGERY | Facility: CLINIC | Age: 31
End: 2021-03-05

## 2021-03-05 NOTE — TELEPHONE ENCOUNTER
I spoke with her.  We discussed the MRI findings.  She tells me she would prefer to try conservative treatment before considering any surgical options.  I certainly think that is very reasonable for her given her young age.  I will have the office call her to make her an appointment in the office to discuss further.

## 2021-04-16 ENCOUNTER — BULK ORDERING (OUTPATIENT)
Dept: CASE MANAGEMENT | Facility: OTHER | Age: 31
End: 2021-04-16

## 2021-04-16 DIAGNOSIS — Z23 IMMUNIZATION DUE: ICD-10-CM

## 2021-07-12 ENCOUNTER — TELEPHONE (OUTPATIENT)
Dept: ORTHOPEDIC SURGERY | Facility: CLINIC | Age: 31
End: 2021-07-12

## 2021-07-12 NOTE — TELEPHONE ENCOUNTER
Caller: PATIENT    Relationship to patient:     Best call back number: [phone]    Patient is needing: PATIENT MISSED APPT ON 7/9/21, SHE WAS IN HOSPITAL ([facility]) FOR STAPH INFECTION AND IS STILL THERE. SHE JUST WANTED DR REY TO KNOW THAT AND SHE WILL CALL BACK WHEN SHE IS FEELING BETTER

## 2021-10-29 ENCOUNTER — OFFICE VISIT (OUTPATIENT)
Dept: ORTHOPEDIC SURGERY | Facility: CLINIC | Age: 31
End: 2021-10-29

## 2021-10-29 VITALS — WEIGHT: 140 LBS | HEIGHT: 63 IN | BODY MASS INDEX: 24.8 KG/M2 | TEMPERATURE: 98.2 F

## 2021-10-29 DIAGNOSIS — M25.511 RIGHT SHOULDER PAIN, UNSPECIFIED CHRONICITY: ICD-10-CM

## 2021-10-29 DIAGNOSIS — R52 PAIN: Primary | ICD-10-CM

## 2021-10-29 PROCEDURE — 73030 X-RAY EXAM OF SHOULDER: CPT | Performed by: ORTHOPAEDIC SURGERY

## 2021-10-29 PROCEDURE — 99213 OFFICE O/P EST LOW 20 MIN: CPT | Performed by: ORTHOPAEDIC SURGERY

## 2021-12-01 ENCOUNTER — TELEPHONE (OUTPATIENT)
Dept: ORTHOPEDIC SURGERY | Facility: CLINIC | Age: 31
End: 2021-12-01

## 2021-12-01 NOTE — TELEPHONE ENCOUNTER
I spoke with Dr. Giron at length.  He is an infectious disease doctor.  He says that her work-up shows no evidence for infection in the shoulder.  It looks like she has severe avascular necrosis.  He says that from an infection standpoint, she is cleared for any potential surgery.  Going to have my office contact her to make her another appointment with me to discuss.

## 2021-12-01 NOTE — TELEPHONE ENCOUNTER
PT HAS BEEN SCHEDULED TO SEE [facility] 12/17 @ 410 AT OUR [facility] LOCATION. PT HAS BEEN MADE AWARE OF TIME, DATE AND LOCATION OF APPT AND GAVE VERBAL UNDERSTANDING.

## 2021-12-06 ENCOUNTER — APPOINTMENT (OUTPATIENT)
Dept: MRI IMAGING | Facility: HOSPITAL | Age: 31
End: 2021-12-06

## 2022-01-14 ENCOUNTER — TELEPHONE (OUTPATIENT)
Dept: ORTHOPEDIC SURGERY | Facility: CLINIC | Age: 32
End: 2022-01-14

## 2022-01-14 NOTE — TELEPHONE ENCOUNTER
Provider: KRISSY  Caller: ALLYSSA MORGAN  Relationship to Patient: PATIENT  Pharmacy: N/A  Phone Number: [phone]  Reason for Call: PATIENT CALLED AND CXL BECAUSE OF COVID SYMPTOMS.  DID NOT WT TO OFFICE BECAUSE PATIENT COUGHING SO EXCESSIVE SHE COULD BARELY SPEAK SO HUB WENT AHEAD AND CANCELED APPT.  PATIENT HAS YET TO BE TESTED  When was the patient last seen: 10.29.21

## 2022-03-04 ENCOUNTER — OFFICE VISIT (OUTPATIENT)
Dept: ORTHOPEDIC SURGERY | Facility: CLINIC | Age: 32
End: 2022-03-04

## 2022-03-04 VITALS — WEIGHT: 137 LBS | TEMPERATURE: 96.4 F | HEIGHT: 63 IN | BODY MASS INDEX: 24.27 KG/M2

## 2022-03-04 DIAGNOSIS — M25.511 CHRONIC RIGHT SHOULDER PAIN: Primary | ICD-10-CM

## 2022-03-04 DIAGNOSIS — G89.29 CHRONIC RIGHT SHOULDER PAIN: Primary | ICD-10-CM

## 2022-03-04 PROCEDURE — 99213 OFFICE O/P EST LOW 20 MIN: CPT | Performed by: ORTHOPAEDIC SURGERY

## 2022-03-04 NOTE — PROGRESS NOTES
Chief complaint: Follow-up right shoulder pain,    Orville follows up for her right shoulder.  She says that she is miserable.  She wants to get the replacement scheduled.  She says she has been given clearance by her hematologist.    Her right shoulder is briefly examined.  Skin is benign.  Moderate anterior tenderness.  Her motion is limited and painful.  Could not really get a good assessment of her cuff strength due to guarding.  Deltoid fires on exam.  Intact axillary nerve sensation.    I was unable to access the reports of her MRIs.  For some reason those are not loading in the system.  I will try to track those down.  It looks like she has findings consistent with avascular necrosis but there is no evidence, to my eye, for any metastatic or neoplastic process.    Assessment: Right shoulder avascular necrosis in patient with history of sickle cell disease    Plan: I want to track down the MRI reports but I think this is probably all avascular necrosis.  We discussed her options.  She adamantly wants to move forward with a replacement at this point.  She insist that all other conservative treatment has failed.  We discussed this option.  She tells me that she has an impacted tooth and is on clindamycin.  She is thinking that she may need a tooth extraction in the near future.  She actually has an appointment to see a dentist next week.  I told her I not feel comfortable scheduling anything for her shoulder until we know what the plan is for then impacted tooth.  She would certainly need to be off of all antibiotics before I would consider her a candidate for surgery.  She will call me next week after she sees the dentist.

## 2022-08-24 ENCOUNTER — TELEPHONE (OUTPATIENT)
Dept: ORTHOPEDIC SURGERY | Facility: CLINIC | Age: 32
End: 2022-08-24

## 2022-08-24 NOTE — TELEPHONE ENCOUNTER
"  Caller: ALLYSSA   Relationship to Patient: SELF     Phone Number: [phone]   Reason for Call: PATIENT CALLING ASKING IF WE WOULD LET DR REY KNOW THAT SHE STILL HAS NOT HAD HER TOOTH PULLED SHE STATED \"THEY WERE PLAYING MIND GAMES\" AND SHES CURRENTLY IN THE HOSPITAL STATED SHE WANTED TO LET HIM KNOW DUE TO HER WANTING TO HAVE SX WITH HIM       "